# Patient Record
Sex: MALE | Race: OTHER | Employment: OTHER | URBAN - METROPOLITAN AREA
[De-identification: names, ages, dates, MRNs, and addresses within clinical notes are randomized per-mention and may not be internally consistent; named-entity substitution may affect disease eponyms.]

---

## 2022-02-09 ENCOUNTER — NEW PATIENT (OUTPATIENT)
Dept: URBAN - METROPOLITAN AREA CLINIC 36 | Facility: CLINIC | Age: 82
End: 2022-02-09

## 2022-02-09 DIAGNOSIS — H25.811: ICD-10-CM

## 2022-02-09 DIAGNOSIS — H52.7: ICD-10-CM

## 2022-02-09 DIAGNOSIS — H25.812: ICD-10-CM

## 2022-02-09 DIAGNOSIS — H43.811: ICD-10-CM

## 2022-02-09 PROCEDURE — 92015 DETERMINE REFRACTIVE STATE: CPT

## 2022-02-09 PROCEDURE — 92004 COMPRE OPH EXAM NEW PT 1/>: CPT

## 2022-02-09 ASSESSMENT — VISUAL ACUITY
OS_SC: 20/40-1
OD_SC: J1
OD_SC: CF 6FT
OS_CC: 20/30
OS_SC: J12
OD_CC: J2
OD_CC: 20/80-1
OD_PH: 20/40
OS_CC: J2

## 2022-02-09 ASSESSMENT — TONOMETRY
OS_IOP_MMHG: 15
OD_IOP_MMHG: 15

## 2022-05-23 ENCOUNTER — CONSULTATION/EVALUATION (OUTPATIENT)
Dept: URBAN - METROPOLITAN AREA CLINIC 43 | Facility: CLINIC | Age: 82
End: 2022-05-23

## 2022-05-23 DIAGNOSIS — H02.883: ICD-10-CM

## 2022-05-23 DIAGNOSIS — H52.7: ICD-10-CM

## 2022-05-23 DIAGNOSIS — H25.813: ICD-10-CM

## 2022-05-23 DIAGNOSIS — H02.886: ICD-10-CM

## 2022-05-23 DIAGNOSIS — H04.123: ICD-10-CM

## 2022-05-23 DIAGNOSIS — H01.003: ICD-10-CM

## 2022-05-23 DIAGNOSIS — H43.811: ICD-10-CM

## 2022-05-23 DIAGNOSIS — H01.006: ICD-10-CM

## 2022-05-23 PROCEDURE — 92250 FUNDUS PHOTOGRAPHY W/I&R: CPT

## 2022-05-23 PROCEDURE — 99204 OFFICE O/P NEW MOD 45 MIN: CPT

## 2022-05-23 PROCEDURE — 92134 CPTRZ OPH DX IMG PST SGM RTA: CPT

## 2022-05-23 PROCEDURE — 92025-2 CORNEAL TOPOGRAPHY, PT

## 2022-05-23 PROCEDURE — 92136TC INTERFEROMETRY - TECHNICAL COMPONENT

## 2022-05-23 ASSESSMENT — VISUAL ACUITY
OS_PH: 20/40+1
OD_CC: 20/70-2
OS_BAT: 20/400
OS_CC: J1
OS_CC: 20/50+1
OD_SC: 20/400
OD_BAT: 20/400
OS_SC: 20/100+1
OD_PH: 20/40-2
OS_SC: J8
OD_SC: J1
OD_CC: J3

## 2022-05-23 NOTE — PATIENT DISCUSSION
We discussed at length with the patient the different refractive targets and their advantages and disadvantages. After answering all questions, the patient has decided to target distance vision in both eyes with the understanding that they will lose ALL of their natural near vision and require glasses for all near activities including reading. JEFF had lengthy discussion with patient about this. Patient elects Basic+ tgt parvin.

## 2022-06-10 ENCOUNTER — SURGERY/PROCEDURE (OUTPATIENT)
Dept: URBAN - METROPOLITAN AREA CLINIC 43 | Facility: CLINIC | Age: 82
End: 2022-06-10

## 2022-06-10 ENCOUNTER — PRE-OP/H&P (OUTPATIENT)
Dept: URBAN - METROPOLITAN AREA SURGERY 14 | Facility: SURGERY | Age: 82
End: 2022-06-10

## 2022-06-10 ENCOUNTER — POST-OP (OUTPATIENT)
Dept: URBAN - METROPOLITAN AREA SURGERY 14 | Facility: SURGERY | Age: 82
End: 2022-06-10

## 2022-06-10 DIAGNOSIS — H04.123: ICD-10-CM

## 2022-06-10 DIAGNOSIS — H52.7: ICD-10-CM

## 2022-06-10 DIAGNOSIS — H02.883: ICD-10-CM

## 2022-06-10 DIAGNOSIS — H02.886: ICD-10-CM

## 2022-06-10 DIAGNOSIS — H25.813: ICD-10-CM

## 2022-06-10 DIAGNOSIS — H01.003: ICD-10-CM

## 2022-06-10 DIAGNOSIS — H43.811: ICD-10-CM

## 2022-06-10 DIAGNOSIS — H01.006: ICD-10-CM

## 2022-06-10 DIAGNOSIS — Z96.1: ICD-10-CM

## 2022-06-10 PROCEDURE — 99211T TECH SERVICE

## 2022-06-10 PROCEDURE — 99211HP H&P OFFICE/OUTPATIENT VISIT, EST

## 2022-06-10 PROCEDURE — 66984 XCAPSL CTRC RMVL W/O ECP: CPT

## 2022-06-10 ASSESSMENT — TONOMETRY: OD_IOP_MMHG: 12

## 2022-06-10 ASSESSMENT — VISUAL ACUITY: OD_SC: 20/80+1

## 2022-06-14 ENCOUNTER — POST OP/EVAL OF SECOND EYE (OUTPATIENT)
Dept: URBAN - METROPOLITAN AREA CLINIC 36 | Facility: CLINIC | Age: 82
End: 2022-06-14

## 2022-06-14 DIAGNOSIS — H43.811: ICD-10-CM

## 2022-06-14 DIAGNOSIS — H25.812: ICD-10-CM

## 2022-06-14 DIAGNOSIS — Z96.1: ICD-10-CM

## 2022-06-14 DIAGNOSIS — H01.003: ICD-10-CM

## 2022-06-14 DIAGNOSIS — H02.886: ICD-10-CM

## 2022-06-14 DIAGNOSIS — H02.883: ICD-10-CM

## 2022-06-14 DIAGNOSIS — H04.123: ICD-10-CM

## 2022-06-14 DIAGNOSIS — H01.006: ICD-10-CM

## 2022-06-14 PROCEDURE — 92012 INTRM OPH EXAM EST PATIENT: CPT

## 2022-06-14 PROCEDURE — 99024 POSTOP FOLLOW-UP VISIT: CPT

## 2022-06-14 ASSESSMENT — VISUAL ACUITY
OD_PH: 20/20
OS_SC: J5
OS_PH: 20/40+2
OD_SC: J8
OS_SC: 20/80-1
OD_SC: 20/40-1

## 2022-06-14 ASSESSMENT — TONOMETRY
OD_IOP_MMHG: 15
OS_IOP_MMHG: 16

## 2022-06-14 NOTE — PATIENT DISCUSSION
Cataract surgery has been performed in the first eye and activities of daily living are still impaired. The patient would like to proceed with cataract surgery in the second eye as scheduled. The patient elects Basic+, OS, goal of parvin.

## 2022-06-17 ENCOUNTER — PRE-OP/H&P (OUTPATIENT)
Dept: URBAN - METROPOLITAN AREA SURGERY 14 | Facility: SURGERY | Age: 82
End: 2022-06-17

## 2022-06-17 ENCOUNTER — SURGERY/PROCEDURE (OUTPATIENT)
Dept: URBAN - METROPOLITAN AREA CLINIC 43 | Facility: CLINIC | Age: 82
End: 2022-06-17

## 2022-06-17 ENCOUNTER — POST-OP (OUTPATIENT)
Dept: URBAN - METROPOLITAN AREA CLINIC 39 | Facility: CLINIC | Age: 82
End: 2022-06-17

## 2022-06-17 DIAGNOSIS — H25.812: ICD-10-CM

## 2022-06-17 DIAGNOSIS — Z96.1: ICD-10-CM

## 2022-06-17 PROCEDURE — 66984 XCAPSL CTRC RMVL W/O ECP: CPT

## 2022-06-17 PROCEDURE — 99211T TECH SERVICE

## 2022-06-17 PROCEDURE — 99211HP H&P OFFICE/OUTPATIENT VISIT, EST

## 2022-06-17 ASSESSMENT — VISUAL ACUITY: OS_SC: 20/70

## 2022-06-17 ASSESSMENT — TONOMETRY: OS_IOP_MMHG: 18

## 2022-06-21 ENCOUNTER — POST-OP (OUTPATIENT)
Dept: URBAN - METROPOLITAN AREA CLINIC 36 | Facility: CLINIC | Age: 82
End: 2022-06-21

## 2022-06-21 DIAGNOSIS — Z96.1: ICD-10-CM

## 2022-06-21 PROCEDURE — 99024 POSTOP FOLLOW-UP VISIT: CPT

## 2022-06-21 ASSESSMENT — VISUAL ACUITY
OD_SC: J12
OS_SC: 20/20-1
OD_SC: 20/25-2
OS_SC: J10

## 2022-06-21 ASSESSMENT — TONOMETRY
OD_IOP_MMHG: 18
OS_IOP_MMHG: 17

## 2022-06-30 ENCOUNTER — POST-OP (OUTPATIENT)
Dept: URBAN - METROPOLITAN AREA CLINIC 36 | Facility: CLINIC | Age: 82
End: 2022-06-30

## 2022-06-30 DIAGNOSIS — Z96.1: ICD-10-CM

## 2022-06-30 PROCEDURE — 99024 POSTOP FOLLOW-UP VISIT: CPT

## 2022-06-30 ASSESSMENT — TONOMETRY
OS_IOP_MMHG: 18
OD_IOP_MMHG: 17

## 2022-06-30 ASSESSMENT — VISUAL ACUITY
OS_SC: J10
OS_SC: 20/20
OD_SC: 20/25-2
OD_SC: J10

## 2022-06-30 NOTE — PATIENT DISCUSSION
1 Week 6 day PO: Patient is doing well post-operatively. The importance of post-op drop compliance was emphasized. Drop schedule reviewed with patient. Patient to call if any visual changes or concerns.

## 2022-06-30 NOTE — PATIENT DISCUSSION
2 week 6 Day PO: Patient is doing well post-operatively. The importance of post-op drop compliance was emphasized. Drop scheduled reviewed with patient. Patient to call if any visual changes or concerns.

## 2022-07-19 ENCOUNTER — POST-OP (OUTPATIENT)
Dept: URBAN - METROPOLITAN AREA CLINIC 36 | Facility: CLINIC | Age: 82
End: 2022-07-19

## 2022-07-19 DIAGNOSIS — Z96.1: ICD-10-CM

## 2022-07-19 PROCEDURE — 99024 POSTOP FOLLOW-UP VISIT: CPT

## 2022-07-19 ASSESSMENT — VISUAL ACUITY
OD_SC: J12
OD_CC: J3
OD_PH: 20/20-1
OS_SC: J10
OS_SC: 20/25+2
OD_SC: 20/40+1
OS_CC: J2

## 2022-07-19 ASSESSMENT — TONOMETRY
OD_IOP_MMHG: 18
OS_IOP_MMHG: 17

## 2023-04-05 ENCOUNTER — COMPREHENSIVE EXAM (OUTPATIENT)
Dept: URBAN - METROPOLITAN AREA CLINIC 36 | Facility: CLINIC | Age: 83
End: 2023-04-05

## 2023-04-05 DIAGNOSIS — H01.003: ICD-10-CM

## 2023-04-05 DIAGNOSIS — H02.886: ICD-10-CM

## 2023-04-05 DIAGNOSIS — H43.811: ICD-10-CM

## 2023-04-05 DIAGNOSIS — H04.123: ICD-10-CM

## 2023-04-05 DIAGNOSIS — H02.883: ICD-10-CM

## 2023-04-05 DIAGNOSIS — H52.7: ICD-10-CM

## 2023-04-05 DIAGNOSIS — H01.006: ICD-10-CM

## 2023-04-05 DIAGNOSIS — Z96.1: ICD-10-CM

## 2023-04-05 PROCEDURE — 92015 DETERMINE REFRACTIVE STATE: CPT

## 2023-04-05 PROCEDURE — 92014 COMPRE OPH EXAM EST PT 1/>: CPT

## 2023-04-05 ASSESSMENT — VISUAL ACUITY
OS_SC: J10
OD_SC: >J10
OD_SC: 20/60+2
OD_PH: 20/40+2
OS_SC: 20/25+1

## 2023-04-05 ASSESSMENT — TONOMETRY
OS_IOP_MMHG: 10
OD_IOP_MMHG: 11

## 2024-09-13 ENCOUNTER — EMERGENCY VISIT (OUTPATIENT)
Dept: URBAN - METROPOLITAN AREA CLINIC 36 | Facility: CLINIC | Age: 84
End: 2024-09-13

## 2024-09-13 DIAGNOSIS — H11.32: ICD-10-CM

## 2024-09-13 DIAGNOSIS — S05.12XA: ICD-10-CM

## 2024-09-13 PROCEDURE — 99213 OFFICE O/P EST LOW 20 MIN: CPT

## 2025-08-08 ENCOUNTER — COMPREHENSIVE EXAM (OUTPATIENT)
Age: 85
End: 2025-08-08

## 2025-08-08 DIAGNOSIS — H43.811: ICD-10-CM

## 2025-08-08 DIAGNOSIS — Z96.1: ICD-10-CM

## 2025-08-08 DIAGNOSIS — H01.003: ICD-10-CM

## 2025-08-08 DIAGNOSIS — S05.12XD: ICD-10-CM

## 2025-08-08 DIAGNOSIS — H04.123: ICD-10-CM

## 2025-08-08 DIAGNOSIS — H02.883: ICD-10-CM

## 2025-08-08 DIAGNOSIS — H02.886: ICD-10-CM

## 2025-08-08 DIAGNOSIS — H01.006: ICD-10-CM

## 2025-08-08 PROCEDURE — 92014 COMPRE OPH EXAM EST PT 1/>: CPT
